# Patient Record
Sex: FEMALE | Race: WHITE | NOT HISPANIC OR LATINO | Employment: UNEMPLOYED | URBAN - METROPOLITAN AREA
[De-identification: names, ages, dates, MRNs, and addresses within clinical notes are randomized per-mention and may not be internally consistent; named-entity substitution may affect disease eponyms.]

---

## 2024-05-14 ENCOUNTER — APPOINTMENT (OUTPATIENT)
Dept: CARDIOLOGY | Facility: HOSPITAL | Age: 29
End: 2024-05-14

## 2024-05-14 ENCOUNTER — HOSPITAL ENCOUNTER (EMERGENCY)
Facility: HOSPITAL | Age: 29
Discharge: HOME | End: 2024-05-14
Attending: STUDENT IN AN ORGANIZED HEALTH CARE EDUCATION/TRAINING PROGRAM

## 2024-05-14 VITALS
HEIGHT: 67 IN | RESPIRATION RATE: 16 BRPM | BODY MASS INDEX: 25.54 KG/M2 | DIASTOLIC BLOOD PRESSURE: 91 MMHG | OXYGEN SATURATION: 99 % | SYSTOLIC BLOOD PRESSURE: 127 MMHG | WEIGHT: 162.7 LBS | TEMPERATURE: 98.4 F | HEART RATE: 77 BPM

## 2024-05-14 DIAGNOSIS — F41.9 ANXIETY: Primary | ICD-10-CM

## 2024-05-14 LAB — GLUCOSE BLD MANUAL STRIP-MCNC: 139 MG/DL (ref 74–99)

## 2024-05-14 PROCEDURE — 93005 ELECTROCARDIOGRAM TRACING: CPT

## 2024-05-14 PROCEDURE — 2500000001 HC RX 250 WO HCPCS SELF ADMINISTERED DRUGS (ALT 637 FOR MEDICARE OP): Performed by: STUDENT IN AN ORGANIZED HEALTH CARE EDUCATION/TRAINING PROGRAM

## 2024-05-14 PROCEDURE — 82947 ASSAY GLUCOSE BLOOD QUANT: CPT

## 2024-05-14 PROCEDURE — 99283 EMERGENCY DEPT VISIT LOW MDM: CPT

## 2024-05-14 RX ORDER — HYDROXYZINE HYDROCHLORIDE 25 MG/1
25 TABLET, FILM COATED ORAL ONCE
Status: COMPLETED | OUTPATIENT
Start: 2024-05-14 | End: 2024-05-14

## 2024-05-14 RX ORDER — HYDROXYZINE HYDROCHLORIDE 25 MG/1
25 TABLET, FILM COATED ORAL EVERY 8 HOURS PRN
Qty: 10 TABLET | Refills: 0 | Status: SHIPPED | OUTPATIENT
Start: 2024-05-14 | End: 2024-05-17

## 2024-05-14 RX ADMIN — HYDROXYZINE HYDROCHLORIDE 25 MG: 25 TABLET, FILM COATED ORAL at 11:53

## 2024-05-14 SDOH — HEALTH STABILITY: MENTAL HEALTH: BEHAVIORS/MOOD: ANXIOUS;CALM;COOPERATIVE

## 2024-05-14 ASSESSMENT — PAIN - FUNCTIONAL ASSESSMENT: PAIN_FUNCTIONAL_ASSESSMENT: 0-10

## 2024-05-14 ASSESSMENT — PAIN DESCRIPTION - PAIN TYPE: TYPE: ACUTE PAIN

## 2024-05-14 ASSESSMENT — COLUMBIA-SUICIDE SEVERITY RATING SCALE - C-SSRS
1. IN THE PAST MONTH, HAVE YOU WISHED YOU WERE DEAD OR WISHED YOU COULD GO TO SLEEP AND NOT WAKE UP?: NO
2. HAVE YOU ACTUALLY HAD ANY THOUGHTS OF KILLING YOURSELF?: NO
6. HAVE YOU EVER DONE ANYTHING, STARTED TO DO ANYTHING, OR PREPARED TO DO ANYTHING TO END YOUR LIFE?: NO

## 2024-05-14 ASSESSMENT — PAIN DESCRIPTION - FREQUENCY: FREQUENCY: INTERMITTENT

## 2024-05-14 ASSESSMENT — PAIN DESCRIPTION - DESCRIPTORS: DESCRIPTORS: SHARP;STABBING

## 2024-05-14 ASSESSMENT — PAIN DESCRIPTION - ORIENTATION: ORIENTATION: UPPER

## 2024-05-14 ASSESSMENT — PAIN DESCRIPTION - PROGRESSION: CLINICAL_PROGRESSION: NOT CHANGED

## 2024-05-14 ASSESSMENT — PAIN SCALES - GENERAL: PAINLEVEL_OUTOF10: 7

## 2024-05-14 ASSESSMENT — PAIN DESCRIPTION - ONSET: ONSET: SUDDEN

## 2024-05-14 ASSESSMENT — PAIN DESCRIPTION - LOCATION: LOCATION: ABDOMEN

## 2024-05-14 NOTE — DISCHARGE INSTRUCTIONS
You have been given a prescription for hydroxyzine to use as needed.  You should follow-up with a primary care physician.    It is important to remember that your care does not end here and you must continue to monitor your condition closely. Please return to the emergency department for any worsening or concerning signs or symptoms as directed by our conversations and the discharge instructions. If you do not have a doctor please contact the referral number on your discharge instructions. Please contact any physician specialists provided in your discharge notes as it is very important to follow up with them regarding your condition. If you are unable to reach the physicians provided, please come back to the Emergency Department at any time.    Return to emergency room without delay for ANY new or worsening pains or for any other symptoms or concerns.  Return with worsening pains, nausea, vomiting, trouble breathing, palpitations, shortness of breath, inability to pass stool or urine, loss of control of stool or urine, any numbness or tingling (that is not normal for you), uncontrolled fevers, the passing of blood or other material in stool or urine, rashes, pains or for any other symptoms or concerns you may have.  You are always welcome to return to the ER at any time for any reason or for any other concerns you may have.

## 2024-05-14 NOTE — ED PROVIDER NOTES
HPI   Chief Complaint   Patient presents with    Anxiety     I get a hot and cold feeling then shaky and I also have epigastric pain which is stabbing and sharp I have constipation for the past 24 hrs and my thought race        Patient presents complaining of feeling shaky, hot, and cold.  She states that she had had plans to have an allergy panel but she lost her Medicaid at the beginning of the year and so has not been evaluated by a physician since then.  She states that she is not able to eat a lot of food so eats nuts and questions whether these knots may be causing her symptoms.  She reports that when she has her episodes, they typically last for a week and are sometimes associated with chest pain.  She gets nauseous as well.  She says that her episode started in January of this year when she had to abruptly run a car and [place] family [place] due to the death of her uncle.                          Marissa Coma Scale Score: 15                     Patient History   Past Medical History:   Diagnosis Date    Other specified health status     Known health problems: none     No past surgical history on file.  No family history on file.  Social History     Tobacco Use    Smoking status: Not on file    Smokeless tobacco: Not on file   Substance Use Topics    Alcohol use: Not on file    Drug use: Not on file       Physical Exam   ED Triage Vitals [05/14/24 0934]   Temperature Heart Rate Respirations BP   36.9 °C (98.4 °F) (!) 122 18 (!) 155/102      Pulse Ox Temp Source Heart Rate Source Patient Position   100 % Oral Monitor Sitting      BP Location FiO2 (%)     Left arm --       Physical Exam  HENT:      Head: Normocephalic.   Eyes:      General: No scleral icterus.  Pulmonary:      Effort: Pulmonary effort is normal.   Neurological:      General: No focal deficit present.      Mental Status: She is alert.   Psychiatric:         Mood and Affect: Mood normal.         ED Course &  [facility]   ED Course as of 05/14/24 1152   Tue May 14, 2024   1131 EKG interpretation: Sinus tachycardia, rate 108.  Normal axis.  Less than 1 mm of ST depression noted diffusely. [ML]      ED Course User Index  [ML] Varinder Urbina MD         Diagnoses as of 05/14/24 1152   Anxiety       Medical Decision Making  Blood glucose was unremarkable.  EKG without acute findings.  Patient was noted to be tachycardic although heart rate was improving at time of EKG and my suspicion for acute coronary syndrome, PE, or sepsis as etiology of tachycardia is very low.  Patient given prescription for hydroxyzine to use as needed and was advised to follow-up with primary care physician.  Parts of this chart were completed with dictation software, please excuse any errors in transcription.        Procedure  Procedures     Varinder Urbina MD  05/14/24 9361

## 2024-05-22 LAB
ATRIAL RATE: 108 BPM
P AXIS: 75 DEGREES
P OFFSET: 214 MS
P ONSET: 154 MS
PR INTERVAL: 132 MS
Q ONSET: 220 MS
QRS COUNT: 17 BEATS
QRS DURATION: 78 MS
QT INTERVAL: 324 MS
QTC CALCULATION(BAZETT): 434 MS
QTC FREDERICIA: 394 MS
R AXIS: 68 DEGREES
T AXIS: 31 DEGREES
T OFFSET: 382 MS
VENTRICULAR RATE: 108 BPM

## 2024-12-09 ENCOUNTER — ANCILLARY PROCEDURE (OUTPATIENT)
Dept: URGENT CARE | Age: 29
End: 2024-12-09

## 2024-12-09 ENCOUNTER — OFFICE VISIT (OUTPATIENT)
Dept: URGENT CARE | Age: 29
End: 2024-12-09

## 2024-12-09 VITALS
SYSTOLIC BLOOD PRESSURE: 104 MMHG | OXYGEN SATURATION: 100 % | BODY MASS INDEX: 25.9 KG/M2 | HEIGHT: 67 IN | WEIGHT: 165 LBS | TEMPERATURE: 96.9 F | DIASTOLIC BLOOD PRESSURE: 71 MMHG | HEART RATE: 81 BPM

## 2024-12-09 DIAGNOSIS — S52.592A OTHER CLOSED FRACTURE OF DISTAL END OF LEFT RADIUS, INITIAL ENCOUNTER: Primary | ICD-10-CM
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
## 2024-12-09 PROCEDURE — 3008F BODY MASS INDEX DOCD: CPT | Performed by: PHYSICIAN ASSISTANT

## 2024-12-09 PROCEDURE — 73110 X-RAY EXAM OF WRIST: CPT | Mod: LEFT SIDE | Performed by: PHYSICIAN ASSISTANT

## 2024-12-09 PROCEDURE — 99214 OFFICE O/P EST MOD 30 MIN: CPT | Performed by: PHYSICIAN ASSISTANT

## 2024-12-09 RX ORDER — ESCITALOPRAM OXALATE 10 MG/1
TABLET ORAL
COMMUNITY
Start: 2024-05-17

## 2024-12-09 NOTE — PROGRESS NOTES
"Subjective   Patient ID: Yenni Mercado is a 29 y.o. female. They present today with a chief complaint of Injury (Fell down stairs this morning. Left wrist. Can't rotate it at all but move fingers a little bit).    History of Present Illness  Patient is a pleasant 29-year-old white female, no significant past medical history, presented to clinic with chief complaint of wrist injury.  Patient states she was walking down her stairs earlier this morning and slipped on the last stair and fell onto her left outstretched hand.  She presented to clinic with complaint of pain and swelling and bruising associated with the left wrist.  Denies any other injuries.  No head injury or loss of consciousness.  No use of blood thinners.  Denies any numbness or tingling.  She reports drastically increased pain with any flexion or extension of the wrist.        Injury      Past Medical History  Allergies as of 12/09/2024    (No Known Allergies)       (Not in a hospital admission)         Past Medical History:   Diagnosis Date    Other specified health status     Known health problems: none       No past surgical history on file.         Review of Systems  Review of Systems                               Objective    Vitals:    12/09/24 0910   BP: 104/71   Pulse: 81   Temp: 36.1 °C (96.9 °F)   TempSrc: Temporal   SpO2: 100%   Weight: 74.8 kg (165 lb)   Height: 1.702 m (5' 7\")     No LMP recorded.    Physical Exam  General: Vitals noted, no distress. Afebrile.     EENT: TMs clear. Eyes unremarkable. Posterior oropharynx unremarkable.   Cardiac: Regular, rate, rhythm, no murmur.     Pulmonary: Lungs clear bilaterally with good aeration. No adventitious breath sounds.     Abdomen: Soft, nontender, nonsurgical. No peritoneal signs. Normoactive bowel sounds.     Extremities: No peripheral edema. Negative Homans bilaterally, no cords. Exam of the left wrist shows moderate focal tenderness to palpation over the distal radius with associated " soft tissue swelling and overlying ecchymosis. Is nontender over the anatomic snuffbox. Has no pain with axial loading of thumb. The skin is intact. Is neurovascularly intact distally. Is nontender over the hand. The remainder of the extremity is nontender.     Skin: No rash.     Neuro: No focal neurologic deficits  Procedures    Point of Care Test & Imaging Results from this visit    XR wrist left 3+ views    Result Date: 12/9/2024  Interpreted By:  Jona Mason, STUDY: XR WRIST LEFT 3+ VIEWS; ;  12/9/2024 9:20 am   INDICATION: Signs/Symptoms:injury.   COMPARISON: None.   ACCESSION NUMBER(S): [id number]   ORDERING CLINICIAN: JUAN CARLISLE   FINDINGS: There is nondisplaced minimally impacted transverse fracture of the distal radial metaphysis. There is associated soft tissue swelling. The alignment is anatomic. The joint spaces and articular surfaces are maintained.       Transverse nondisplaced minimally impacted fracture of the distal radial metaphysis.   Signed by: Jona Mason 12/9/2024 9:49 AM Dictation workstation:   [id number]     Diagnostic study results (if any) were reviewed by Juan Carlisle PA-C.    Assessment/Plan   Allergies, medications, history, and pertinent labs/EKGs/Imaging reviewed by Juan Carlisle PA-C.     Medical Decision Making  Patient was seen eval in the clinic for complaint of left wrist injury.  On exam patient is nontoxic well-appearing respect comfortably no acute distress.  Vital signs are stable, afebrile.  Chest is clear, heart is regular, belly soft and nontender.  Evaluation of left wrist as above highly suspicious for underlying fracture.  X-ray imaging was obtained which reveals a transverse nondisplaced minimally impacted fracture of the distal radial metaphysis.  Patient was placed in a Colles' aluminum splint with Ace wrap and will be discharged home at this time.  Provided follow-up with orthopedics for the next week.  Advise she ice elevate and  use ibuprofen and Tylenol at home as needed for pain.  Advise she follow-up with her primary care physician as well in the next 2 to 3 weeks.  Reviewed my impression, plan, strict return versus report to ED precautions with the patient.  She expresses understanding and agreement plan of care.    Orders and Diagnoses  Diagnoses and all orders for this visit:  Other closed fracture of distal end of left radius, initial encounter  -     Referral to Orthopaedic Surgery; Future  Fall (on) (from) other stairs and steps, initial encounter  -     XR wrist left 3+ views; Future        Medical Admin Record      Follow Up Instructions  No follow-ups on file.    Patient disposition: Home    Electronically signed by Juan Barone PA-C  10:42 AM

## 2024-12-09 NOTE — PATIENT INSTRUCTIONS
If you do not hear from central scheduling in the next 48 hours to set up your follow up appointment, please call [phone]

## 2024-12-09 NOTE — LETTER
December 9, 2024     Patient: Yenni Mercado   YOB: 1995   Date of Visit: 12/9/2024       To Whom It May Concern:    Yenni Mercado was seen in my clinic on 12/9/2024 at 8:45 am. Please excuse Yenni for her absence from work on this day to make the appointment.    If you have any questions or concerns, please don't hesitate to call.         Sincerely,         Juan Barone PA-C        CC: No Recipients

## 2024-12-13 ENCOUNTER — APPOINTMENT (OUTPATIENT)
Dept: ORTHOPEDIC SURGERY | Facility: CLINIC | Age: 29
End: 2024-12-13